# Patient Record
Sex: FEMALE | Race: WHITE
[De-identification: names, ages, dates, MRNs, and addresses within clinical notes are randomized per-mention and may not be internally consistent; named-entity substitution may affect disease eponyms.]

---

## 2017-12-06 ENCOUNTER — HOSPITAL ENCOUNTER (OUTPATIENT)
Dept: HOSPITAL 92 - RAD | Age: 37
Discharge: HOME | End: 2017-12-06
Attending: FAMILY MEDICINE
Payer: COMMERCIAL

## 2017-12-06 DIAGNOSIS — S69.91XA: Primary | ICD-10-CM

## 2017-12-06 NOTE — RAD
THREE VIEWS FIFTH DIGIT RIGHT HAND:

 

History: 37-year-old female with pain. 

 

FINDINGS: 

AP, lateral, and oblique views obtained. 

 

No evidence of fractures, subluxations, or bony lesions seen. 

 

IMPRESSION: 

Normal three views fifth digit right hand.

 

POS: C

## 2020-06-20 ENCOUNTER — HOSPITAL ENCOUNTER (EMERGENCY)
Dept: HOSPITAL 92 - ERS | Age: 40
Discharge: HOME | End: 2020-06-20
Payer: COMMERCIAL

## 2020-06-20 DIAGNOSIS — S42.141A: ICD-10-CM

## 2020-06-20 DIAGNOSIS — S52.512A: Primary | ICD-10-CM

## 2020-06-20 DIAGNOSIS — S41.119A: ICD-10-CM

## 2020-06-20 DIAGNOSIS — S43.014A: ICD-10-CM

## 2020-06-20 LAB
ALBUMIN SERPL BCG-MCNC: 4.2 G/DL (ref 3.5–5)
ALP SERPL-CCNC: 50 U/L (ref 40–110)
ALT SERPL W P-5'-P-CCNC: 14 U/L (ref 8–55)
ANION GAP SERPL CALC-SCNC: 13 MMOL/L (ref 10–20)
AST SERPL-CCNC: 19 U/L (ref 5–34)
BILIRUB SERPL-MCNC: 0.3 MG/DL (ref 0.2–1.2)
BUN SERPL-MCNC: 13 MG/DL (ref 7–18.7)
CALCIUM SERPL-MCNC: 8.7 MG/DL (ref 7.8–10.44)
CHLORIDE SERPL-SCNC: 110 MMOL/L (ref 98–107)
CO2 SERPL-SCNC: 19 MMOL/L (ref 22–29)
CREAT CL PREDICTED SERPL C-G-VRATE: 0 ML/MIN (ref 70–130)
GLOBULIN SER CALC-MCNC: 3.2 G/DL (ref 2.4–3.5)
GLUCOSE SERPL-MCNC: 197 MG/DL (ref 70–105)
HGB BLD-MCNC: 11 G/DL (ref 12–16)
MCH RBC QN AUTO: 24.9 PG (ref 27–31)
MCV RBC AUTO: 79.3 FL (ref 78–98)
MDIFF COMPLETE?: YES
OVALOCYTES BLD QL SMEAR: (no result) (100X)
PLATELET # BLD AUTO: 326 THOU/UL (ref 130–400)
POLYCHROMASIA BLD QL SMEAR: (no result) (100X)
POTASSIUM SERPL-SCNC: 3.8 MMOL/L (ref 3.5–5.1)
PREGS CONTROL BACKGROUND?: (no result)
PREGS CONTROL BAR APPEAR?: YES
RBC # BLD AUTO: 4.42 MILL/UL (ref 4.2–5.4)
SCHISTOCYTES BLD QL SMEAR: (no result) (100X)
SODIUM SERPL-SCNC: 138 MMOL/L (ref 136–145)
WBC # BLD AUTO: 21.3 THOU/UL (ref 4.8–10.8)

## 2020-06-20 PROCEDURE — 80053 COMPREHEN METABOLIC PANEL: CPT

## 2020-06-20 PROCEDURE — 90471 IMMUNIZATION ADMIN: CPT

## 2020-06-20 PROCEDURE — 90715 TDAP VACCINE 7 YRS/> IM: CPT

## 2020-06-20 PROCEDURE — 84703 CHORIONIC GONADOTROPIN ASSAY: CPT

## 2020-06-20 PROCEDURE — 96365 THER/PROPH/DIAG IV INF INIT: CPT

## 2020-06-20 PROCEDURE — 36415 COLL VENOUS BLD VENIPUNCTURE: CPT

## 2020-06-20 PROCEDURE — 70450 CT HEAD/BRAIN W/O DYE: CPT

## 2020-06-20 PROCEDURE — 85025 COMPLETE CBC W/AUTO DIFF WBC: CPT

## 2020-06-20 PROCEDURE — 72125 CT NECK SPINE W/O DYE: CPT

## 2020-06-20 PROCEDURE — 29125 APPL SHORT ARM SPLINT STATIC: CPT

## 2020-06-20 PROCEDURE — 12002 RPR S/N/AX/GEN/TRNK2.6-7.5CM: CPT

## 2020-06-20 PROCEDURE — 96375 TX/PRO/DX INJ NEW DRUG ADDON: CPT

## 2020-06-20 PROCEDURE — 99152 MOD SED SAME PHYS/QHP 5/>YRS: CPT

## 2020-06-20 PROCEDURE — 74177 CT ABD & PELVIS W/CONTRAST: CPT

## 2020-06-20 PROCEDURE — 71260 CT THORAX DX C+: CPT

## 2020-06-20 PROCEDURE — 23650 CLTX SHO DSLC W/MNPJ WO ANES: CPT

## 2020-06-20 NOTE — CT
CT CERVICAL SPINE

6/20/20

 

PROVIDED CLINICAL HISTORY:  

Injury. 

 

FINDINGS: 

No evidence for fracture or traumatic subluxation. No prevertebral soft tissue swelling apparent. The
 visualized lung apices appear clear. 

 

IMPRESSION: 

No evidence for fracture or traumatic subluxation. 

 

POS: WOO

## 2020-06-20 NOTE — RAD
TWO VIEWS RIGHT SHOULDER:

6/20/20

 

PROVIDED CLINICAL HISTORY:  

Post reduction.

 

FINDINGS: 

Comparison to examination earlier same date.

 

The glenohumeral relationship appears normal. Fracture fragments adjacent to the humeral head are red
emonstrated. 

 

IMPRESSION: 

Post reduction. 

 

POS: WOO

## 2020-06-20 NOTE — CT
CT BRAIN 

6/20/20

 

PROVIDED CLINICAL HISTORY:  

Injury.

 

FINDINGS: 

The ventricular system appears normal in size and morphology. There is no evidence for intracranial h
emorrhage or mass effect. The extracranial soft tissues and osseous structures demonstrate an unremar
kable CT appearance.

 

IMPRESSION: 

No evidence for intracranial hemorrhage or mass effect. 

 

POS: WOO

## 2020-06-20 NOTE — RAD
LEFT FOREARM RADIOGRAPHS TWO VIEWS:

6/20/20

 

PROVIDED CLINICAL HISTORY:  

Injury.

 

FINDINGS: 

Not significantly displaced radial styloid fracture. No additional fracture is evident. 

 

IMPRESSION: 

As above. 

 

 

 

POS: WOO

## 2020-06-20 NOTE — RAD
FRONTAL VIEW RIGHT SHOULDER: 

6/20/20

 

PROVIDED CLINICAL HISTORY:  

Pain status post injury. 

 

FINDINGS: 

Anterior dislocation of the right glenohumeral joint is demonstrated. Fracture fragments overlie the 
lateral aspect of the proximal humerus and inferior aspects of the bony glenoid. Fracture morphology 
is better visualized on CT chest, abdomen and pelvis. 

 

IMPRESSION: 

Anterior glenohumeral fracture dislocation. 

 

POS: WOO

## 2020-06-20 NOTE — CT
CT CHEST, ABDOMEN AND PELVIS WITH IV CONTRAST 

6/20/20

 

PROVIDED CLINICAL HISTORY:  

Pain status post injury.

 

FINDINGS: 

The heart, pericardium, and great vessels demonstrate no evidence for traumatic abnormality. The lung
s are free of significant opacity. There is no pleural fluid or pneumothorax apparent. 

 

The solid abdominal organs are suboptimally evaluated due to patient respiratory motion but demonstra
te no definite evidence for traumatic abnormality. There is no bowel dilatation, intraperitoneal fat 
stranding, free intraperitoneal fluid or free intraperitoneal air. The uterus is enlarged and heterog
eneous compatible with fibroid disease. 

 

There is anterior and inferior location of the right humerus with respect to the glenoid. There is co
mminuted displaced fracture involving the region of the greater tuberosity. Vacuum joint phenomenon v
ersus intra-articular fat density. 

 

The thoracic and lumbar spine demonstrate normal alignment and maintenance of vertebral body heights.
 No additional fracture is evident. 

 

IMPRESSION: 

1.      Anterior glenohumeral fracture dislocation. 

2.      No additional evidence for traumatic abnormality involving chest, abdomen and pelvis. 

 

POS: WOO

## 2020-06-20 NOTE — RAD
RIGHT THUMB RADIOGRAPHS THREE VIEWS:

6/20/20

 

PROVIDED CLINICAL HISTORY:  

Pain. Status post injury.

 

No evidence for fracture or other acute osseous abnormality. If there is persistent clinical concern,
 conservative management and follow-up imaging are advised. 

 

IMPRESSION: 

As above. 

 

POS: WOO

## 2020-07-02 ENCOUNTER — HOSPITAL ENCOUNTER (OUTPATIENT)
Dept: HOSPITAL 92 - LABBT | Age: 40
Discharge: HOME | End: 2020-07-02
Attending: ORTHOPAEDIC SURGERY
Payer: COMMERCIAL

## 2020-07-02 DIAGNOSIS — S62.012A: ICD-10-CM

## 2020-07-02 DIAGNOSIS — Z01.812: Primary | ICD-10-CM

## 2020-07-02 DIAGNOSIS — S52.512A: ICD-10-CM

## 2020-07-02 DIAGNOSIS — Z11.59: ICD-10-CM

## 2020-07-02 PROCEDURE — U0003 INFECTIOUS AGENT DETECTION BY NUCLEIC ACID (DNA OR RNA); SEVERE ACUTE RESPIRATORY SYNDROME CORONAVIRUS 2 (SARS-COV-2) (CORONAVIRUS DISEASE [COVID-19]), AMPLIFIED PROBE TECHNIQUE, MAKING USE OF HIGH THROUGHPUT TECHNOLOGIES AS DESCRIBED BY CMS-2020-01-R: HCPCS

## 2020-07-02 PROCEDURE — 87635 SARS-COV-2 COVID-19 AMP PRB: CPT

## 2020-07-06 ENCOUNTER — HOSPITAL ENCOUNTER (OUTPATIENT)
Dept: HOSPITAL 92 - SDC | Age: 40
Discharge: HOME | End: 2020-07-06
Attending: ORTHOPAEDIC SURGERY
Payer: COMMERCIAL

## 2020-07-06 VITALS — BODY MASS INDEX: 34.7 KG/M2

## 2020-07-06 DIAGNOSIS — Z91.038: ICD-10-CM

## 2020-07-06 DIAGNOSIS — Z91.030: ICD-10-CM

## 2020-07-06 DIAGNOSIS — Z79.899: ICD-10-CM

## 2020-07-06 DIAGNOSIS — S62.012A: ICD-10-CM

## 2020-07-06 DIAGNOSIS — S52.512A: Primary | ICD-10-CM

## 2020-07-06 PROCEDURE — 0PSJ04Z REPOSITION LEFT RADIUS WITH INTERNAL FIXATION DEVICE, OPEN APPROACH: ICD-10-PCS | Performed by: ORTHOPAEDIC SURGERY

## 2020-07-06 PROCEDURE — C1713 ANCHOR/SCREW BN/BN,TIS/BN: HCPCS

## 2020-07-06 PROCEDURE — 0PSN04Z REPOSITION LEFT CARPAL WITH INTERNAL FIXATION DEVICE, OPEN APPROACH: ICD-10-PCS | Performed by: ORTHOPAEDIC SURGERY

## 2020-07-06 PROCEDURE — 76000 FLUOROSCOPY <1 HR PHYS/QHP: CPT

## 2020-07-06 PROCEDURE — S0020 INJECTION, BUPIVICAINE HYDRO: HCPCS

## 2020-07-06 NOTE — RAD
9 fluoroscopic spot images of the left wrist: 



7/6/2020 12:00 AM



CLINICAL INDICATION: Operative repair of left wrist fractures



COMPARISON: Left wrist radiograph dated June 30, 2020. 



FINDINGS:



Bones:  Since the comparison examination there is been interval reduction and operative fixation of t
he distal scaphoid and radial styloid process fractures. Fracture alignment is near anatomic.

Instrumentation projects in the expected position. Carpal alignment appears within normal limits. 



Joints: Joints space is preserved.. 



Soft Tissue: Normal..



Total fluoroscopic time was 1 minute and 30 seconds.

  

IMPRESSION: 



Interval ORIF of left scaphoid and radial styloid process fractures.  



Reported By: Dejon Castillo 

Electronically Signed:  7/6/2020 6:23 PM

## 2020-07-07 NOTE — OP
DATE OF PROCEDURE:  07/06/2020



PREOPERATIVE DIAGNOSES:  

1. Left waist scaphoid angulated and displaceable fracture.

2. Left radial styloid fracture, displaced.



POSTOPERATIVE DIAGNOSES:  

1. Left waist scaphoid angulated and displaceable fracture.

2. Left radial styloid fracture, displaced.



PROCEDURES PERFORMED:  

1. Left scaphoid fracture open reduction and internal fixation via different

approach from. 

2. Left radial styloid fracture open reduction and internal fixation.

3. C-arm supervision.



TOURNIQUET TIME:  81 minutes.



ESTIMATED BLOOD LOSS:  Less than or equal to 10 mL.



INDICATION:  The patient with fall, had obvious radial styloid fracture that was

unstable and had a scaphoid fracture that was displaceable based on range of motion

making a displaced fracture distal pole waist junction. 



DESCRIPTION OF PROCEDURE:  After successful general endotracheal anesthesia, the

limb was prepped and draped.  Time-out was done appropriately.  Anesthesia with

general LMA technique performed by [facility].  We then outlined the incision

zigzag exposing the first dorsal compartment and the dorsal radial part of the wrist

to visualize the fracture.  We then carried to the skin and subcutaneous tissue

after exsanguinating the limb and inflated the tourniquet to 250 mmHg pressure.  We

saw the branches of the superficial radial nerve and protected them with dorsal and

palmar force retraction.  We then released subperiosteally the first dorsal

compartment so it could be repaired later.  We visualized the radial edge of the

fracture, irrigated and debrided it and then reduced it anatomically and radiographs

confirmed this.  We then placed two guidewires, each 4 mm apart and appropriately

measured the distance.  We drilled, measured and screwed with and without actual

compression the 3-0 [employer] cannulated screw. 



Then, we closed the retinaculum for the first dorsal compartment, closed the fascia

and we waited till the end of procedure to close the skin and dermal and epidermal

contents. 



Via separate volar approach, centered on the FCR tendon and beginning just at the

radiocarpal junction, made the incision, carried through skin and subcutaneous

tissue, opened up to the proximal 1 cm of the thenar muscle to expose the entire

trapezium.  We then exposed the fracture, saw it was displaceable, but not displaced

after reduction maneuvers.  We then passed a guidewire into it to hold the fracture

in place then the second guidewire that was slightly more dorsal than centered in

the sagittal plane, but in the frontal plane in the center of the long axis.  Over

this, we then measured and we drilled with the final construct being approximately 3

mm short of the tip of the bone in the sagittal plane flush with the dorsal end of

the bone and 2 to 3 mm short of the distal edge of the bone in the frontal plane.

The fracture was not displaced. 



We then finished the screw placement, removed the wire, took final radiographs and

then deflated the tourniquet.  We obtained hemostasis, we then closed the joint

capsule with a 0 Vicryl, the FCR fascia and the muscle with a 2-0 Vicryl undyed and

the patient left the operating room with the dermis and epidermis closed with no

evidence of anesthetic or operative complication and a thumb spica splint. 







Job ID:  [id number]